# Patient Record
Sex: FEMALE | Race: BLACK OR AFRICAN AMERICAN | NOT HISPANIC OR LATINO | Employment: STUDENT | URBAN - METROPOLITAN AREA
[De-identification: names, ages, dates, MRNs, and addresses within clinical notes are randomized per-mention and may not be internally consistent; named-entity substitution may affect disease eponyms.]

---

## 2025-04-08 NOTE — PROGRESS NOTES
"[facility]    Chief complaint: ***    HPI:  Guicho Ware is a 19 y.o. female with pmh of Allergic rhinitis [place] [facility] [place] with ***    To Note, prior was followed by pediatrics and A&I.    Acute Concerns:    Allergies:    PMH:    PSH:     Fx:    Social:     Medications:  No current outpatient medications    Allergies:  Not on File    Past medical history:  No past medical history on file.    Surgical history:  No past surgical history on file.    Family history:  No family history on file.    Social history:       Health maintenance:  Health Maintenance   Topic Date Due    Yearly Adult Physical  Never done    Lipid Panel  Never done    HIV Screening  Never done    Hearing Screening (1) Never done    HPV Vaccines (1 - 3-dose series) Never done    Meningococcal B Vaccine (1 of 2 - Standard) Never done    Hepatitis C Screening  Never done    Cervical Cancer Screening  Never done    COVID-19 Vaccine (1 - 2024-25 season) Never done    DTaP/Tdap/Td Vaccines (7 - Td or Tdap) 08/13/2025    Influenza Vaccine (Season Ended) 09/01/2025    Zoster Vaccines (1 of 2) 05/15/2053    HIB Vaccines  Completed    Hepatitis B Vaccines  Completed    IPV Vaccines  Completed    MMR Vaccines  Completed    Varicella Vaccines  Completed    Meningococcal Vaccine  Completed    Pneumococcal Vaccine: Pediatrics and At-Risk Adult Patients  Completed    Hepatitis A Vaccines  Aged Out    Rotavirus Vaccines  Aged Out       Sexual History  Currently Sexually Active: {YES (DEF)/NO:52590}  Partners: {Sexual History Gender List:10129}  LMP: {Sexual History Menstrual Hx:17256}  Contraception: {YES-DESCRIBE/NO:32041}  STI Concern/Hx: {STI SP:10858}      Review of systems:  Review of Systems     Vitals:  There were no vitals filed for this visit.    Physical exam:  Physical Exam    Labs:  No results found for: \"WBC\", \"HGB\", \"HCT\", \"MCV\", \"PLT\"    No results found for: \"GLUCOSE\", \"CALCIUM\", \"NA\", \"K\", \"CO2\", " "\"CL\", \"BUN\", \"CREATININE\"    No results found for: \"HGBA1C\"     No results found for: \"CHOL\"  No results found for: \"HDL\"  No results found for: \"LDLCALC\"  No results found for: \"TRIG\"  No components found for: \"CHOLHDL\"    Imaging:  Imaging  No results found.    Cardiology, Vascular, and Other Imaging  No other imaging results found for the past 7 days      Assessment and plan:  Guicho Ware is a 19 y.o. female with pmh of allergic rhinitis presenting with ***    Needs:  - HIV/Hep C/ A1c / Lipid  - Tdap 8/2025    Updates:    #***  -    #***  -    #***  -    #***  -      HEALTH MAINTENANCE   Antibody Testing   HIV: negative ***  Syphilis: ***   Hepatitis C: ***   Vaccines  Influenza: Due Fall 2025  Shingles: NA >49yo 2 doses 2-6mo apart  Tdap: ***  Pneumonia: N/A adult <65 w/ risk factors (heart/liver/lung disease; smoking, diabetes) OR >65 unknown vaccine hx PCV 20 -> 1yr -> PPSV 23; PPSV23 -> 1yr -> PCV 20   COVID: ***  Cancer screening   Colonoscopy: N/A age 45-76yo   Pap Smear: *** (21-29 q3 yrs; 30-65 w/HPV q5 yrs if no HPV q3 yrs)   Mammogram: N/A (ACOG rec age 40, USPSTF age 50: shared decision making 40-50 then q1-2 yrs until age 74 and then shared decision making)   Low dose Chest CT: N/A age 50-80 20yr smoking hx current OR quit w/in last 15yrs yearly   DEXA scan: N/A females >65 or <65 hx of hip fracture   Plan     Follow-up in ***.    Patient and plan discussed with attending physician {[facility] [place] attendings:25879}.    Jaron Starks DO  PGY-1  Dept. Of Internal Medicine    "

## 2025-04-09 ENCOUNTER — APPOINTMENT (OUTPATIENT)
Dept: PRIMARY CARE | Facility: HOSPITAL | Age: 20
End: 2025-04-09
Payer: COMMERCIAL